# Patient Record
Sex: FEMALE | Race: WHITE | NOT HISPANIC OR LATINO | Employment: FULL TIME | URBAN - METROPOLITAN AREA
[De-identification: names, ages, dates, MRNs, and addresses within clinical notes are randomized per-mention and may not be internally consistent; named-entity substitution may affect disease eponyms.]

---

## 2019-07-28 ENCOUNTER — HOSPITAL ENCOUNTER (EMERGENCY)
Facility: HOSPITAL | Age: 29
Discharge: HOME | End: 2019-07-28
Attending: EMERGENCY MEDICINE
Payer: COMMERCIAL

## 2019-07-28 VITALS
SYSTOLIC BLOOD PRESSURE: 174 MMHG | OXYGEN SATURATION: 98 % | WEIGHT: 270 LBS | HEART RATE: 86 BPM | DIASTOLIC BLOOD PRESSURE: 99 MMHG | RESPIRATION RATE: 20 BRPM | TEMPERATURE: 98 F | BODY MASS INDEX: 43.39 KG/M2 | HEIGHT: 66 IN

## 2019-07-28 DIAGNOSIS — H60.503 ACUTE OTITIS EXTERNA OF BOTH EARS, UNSPECIFIED TYPE: Primary | ICD-10-CM

## 2019-07-28 LAB
B-HCG UR QL: NEGATIVE
CTP QC/QA: YES

## 2019-07-28 PROCEDURE — 25000003 PHARM REV CODE 250: Performed by: NURSE PRACTITIONER

## 2019-07-28 PROCEDURE — 81025 URINE PREGNANCY TEST: CPT | Performed by: NURSE PRACTITIONER

## 2019-07-28 PROCEDURE — 99283 EMERGENCY DEPT VISIT LOW MDM: CPT | Mod: 25

## 2019-07-28 RX ORDER — LIDOCAINE HYDROCHLORIDE 20 MG/ML
5 SOLUTION OROPHARYNGEAL
Status: COMPLETED | OUTPATIENT
Start: 2019-07-28 | End: 2019-07-28

## 2019-07-28 RX ORDER — AMOXICILLIN 875 MG/1
875 TABLET, FILM COATED ORAL
COMMUNITY
Start: 2019-07-26 | End: 2019-08-02

## 2019-07-28 RX ORDER — ACETAMINOPHEN AND CODEINE PHOSPHATE 300; 30 MG/1; MG/1
1 TABLET ORAL EVERY 6 HOURS PRN
Qty: 10 TABLET | Refills: 0 | Status: SHIPPED | OUTPATIENT
Start: 2019-07-28 | End: 2019-08-07

## 2019-07-28 RX ORDER — NEOMYCIN SULFATE, POLYMYXIN B SULFATE, HYDROCORTISONE 3.5; 10000; 1 MG/ML; [USP'U]/ML; MG/ML
3 SOLUTION/ DROPS AURICULAR (OTIC) 3 TIMES DAILY
COMMUNITY

## 2019-07-28 RX ORDER — ACETAMINOPHEN AND CODEINE PHOSPHATE 300; 30 MG/1; MG/1
1 TABLET ORAL
Status: COMPLETED | OUTPATIENT
Start: 2019-07-28 | End: 2019-07-28

## 2019-07-28 RX ADMIN — LIDOCAINE HYDROCHLORIDE 5 ML: 20 SOLUTION ORAL; TOPICAL at 10:07

## 2019-07-28 RX ADMIN — ACETAMINOPHEN AND CODEINE PHOSPHATE 1 TABLET: 300; 30 TABLET ORAL at 10:07

## 2019-07-28 NOTE — ED PROVIDER NOTES
"Encounter Date: 7/28/2019       History     Chief Complaint   Patient presents with    Otalgia     Pt reporsts she was at beach last week, swimming. She returned with bilateral ear pain. She was seen at Urgent Care on Friday and was Rx Amoxicillin and Ear drops. Pt states that the pain to her ears is worsening and she is unable to hear very well.       Patient is a 28-year-old female with past medical history otitis externa who presents ED for evaluation of bilateral ear pain after swimming at the beach last week.  Patient seen at urgent care 2 days ago for same, given prescription for amoxicillin and ear drops.  Patient states that she started the antibiotics on Friday and has been taking them twice a day with no improvement in pain. Reports drainage to left ear and increased pain to both ears. Describes the pain as "throbbing" and radiating down left jaw.  States the pain is worse with movement palpation.  Denies any alleviating factors.  Patient states that she has been taking "six 200 mg ibuprofen" every 4-6 hours for pain with no improvement.  Denies fever, chills, neck pain/stiffness, cough/congestion, runny nose, sore throat, nausea, vomiting, rash, any other concerns.    The history is provided by the patient.     Review of patient's allergies indicates:  No Known Allergies  History reviewed. No pertinent past medical history.  Past Surgical History:   Procedure Laterality Date    TONSILLECTOMY       Family History   Problem Relation Age of Onset    Colon cancer Paternal Aunt      Social History     Tobacco Use    Smoking status: Never Smoker   Substance Use Topics    Alcohol use: No    Drug use: Not on file     Review of Systems   Constitutional: Negative for chills and fever.   HENT: Positive for ear discharge (right ear) and ear pain (bilateral). Negative for congestion, dental problem, sneezing and sore throat.    Respiratory: Negative for cough.    Musculoskeletal: Negative for neck pain and neck " stiffness.   Hematological: Does not bruise/bleed easily.   All other systems reviewed and are negative.      Physical Exam     Initial Vitals [07/28/19 0937]   BP Pulse Resp Temp SpO2   (!) 174/99 86 20 98.4 °F (36.9 °C) 98 %      MAP       --         Physical Exam    Vitals reviewed.  Constitutional: She is Obese .  Non-toxic appearance. She does not have a sickly appearance.   HENT:   Head: Atraumatic.   Right Ear: There is tenderness. No drainage or swelling. No foreign bodies. No mastoid tenderness. Tympanic membrane is not injected, not perforated, not erythematous and not retracted. A middle ear effusion is present. No hemotympanum. No decreased hearing is noted.   Left Ear: There is tenderness. No drainage or swelling. No foreign bodies. No mastoid tenderness. Tympanic membrane is not injected, not perforated, not erythematous and not retracted. A middle ear effusion is present. No hemotympanum. No decreased hearing is noted.   TM intact bilaterally.  No mastoid induration or erythema noted bilaterally.  No drainage or blood noted.  Tragus pain with erythematous ear canal bilaterally.       Eyes: EOM are normal.   Neck: Normal range of motion, full passive range of motion without pain and phonation normal. Neck supple.   No meningismus.   Cardiovascular: Regular rhythm.   Pulmonary/Chest: No respiratory distress.   Neurological: She is alert and oriented to person, place, and time.   Skin: Skin is warm. No rash noted.   Psychiatric: She has a normal mood and affect.         ED Course   Procedures  Labs Reviewed   POCT URINE PREGNANCY          Imaging Results    None          Medical Decision Making:   History:   Old Medical Records: I decided to obtain old medical records.  Initial Assessment:   Patient presents ED for evaluation of bilateral ear pain after swimming at the beach last week.  Patient seen at urgent care 2 days ago and started on amoxicillin and antibiotic ear drops and reports no improvement  in pain. Appears well, nontoxic.  Afebrile.  Hypertensive in ED.  Patient denies symptoms of hypertension including any:  Vision changes, dizziness, headache, shortness of breath, or chest pain. No meningismus.  TM intact bilaterally. No mastoid induration or erythema noted bilaterally. No drainage or blood noted.  Tragus pain with erythematous ear canal bilaterally.  Clinical Tests:   Lab Tests: Reviewed and Ordered  ED Management:  UPT, p.o. Tylenol 3, lidocaine  UPT negative.  No need for labs or imaging at this time.  Patient's blood pressure elevated in ED today.  I believe patient's blood pressure is elevated due to patient's situation and pain. I do not suspect ACS, hypertensive urgency or emergency.  Advised patient to blood pressure rechecked in approximately 1 week by PCP and to keep a daily recording of blood pressure readings.  Patient reports improvement in pain after interventions.  Patient's signs and symptoms most likely due to otitis externa of bilateral ears.  No signs of mastoiditis.  Patient is hemodynamically stable and will be DC home with prescription for Tylenol 3.  Narcotic precautions given. No red flags on presentation or physical exam. The pt is comfortable going home at this time. After taking into careful account the historical factors and physical exam findings of the patient's presentation today, in conjunction with the empirical and objective data obtained on ED workup, no acute emergent medical condition requiring admission has been identified. The patient appears to be low risk for an emergent medical condition and I feel it is safe and appropriate at this time for the patient to be discharged to follow-up as detailed in their discharge instructions for reevaluation and possible continued outpatient workup and management. I have discussed the specifics of the workup with the patient and the patient has verbalized understanding of the details of the workup, the diagnosis, the  treatment plan, and the need for outpatient follow-up. The patient remained comfortable and stable during their visit in the ED.  Patient also advised to not take as much ibuprofen for pain and to take ibuprofen as labeled and to alternate over-the-counter Tylenol as labeled as needed for pain or fever.  Discharge and follow-up instructions discussed with the patient who expressed understanding and willingness to comply with my recommendations.     This medical record was prepared using voice dictation software. There may be phonetic errors.                      Clinical Impression:       ICD-10-CM ICD-9-CM   1. Acute otitis externa of both ears, unspecified type H60.503 380.10                                Wesley Campbell NP  07/28/19 1219       Wesley Campbell NP  07/28/19 1223

## 2019-07-28 NOTE — ED TRIAGE NOTES
Pt presents to ED with C/O bilat ear pain with onset after swimming at this beach last week. Pt states she was seen at  and was prescribed Antibotics she states drainage from R ear and more pain to the L ear, she states throbbing and pain going down the saw. She did take otc medication with little relief. Pain is 9/10 at this time.

## 2019-07-28 NOTE — DISCHARGE INSTRUCTIONS
Continue prescribed ear drops for both ears and continue oral antibiotics until complete.  Take pain medication as labeled as needed for pain do not drive, drink alcohol, operate machinery while taking pain medication.  Stop taking so much ibuprofen at one time and take ibuprofen as labeled and as needed for pain you can also alternate over-the-counter Tylenol as labeled as needed.  Return to ED for any concerns or worsening symptoms.